# Patient Record
Sex: FEMALE | Race: BLACK OR AFRICAN AMERICAN | NOT HISPANIC OR LATINO | URBAN - METROPOLITAN AREA
[De-identification: names, ages, dates, MRNs, and addresses within clinical notes are randomized per-mention and may not be internally consistent; named-entity substitution may affect disease eponyms.]

---

## 2020-02-09 ENCOUNTER — OUTPATIENT (OUTPATIENT)
Dept: INPATIENT UNIT | Facility: HOSPITAL | Age: 30
LOS: 1 days | End: 2020-02-09
Payer: COMMERCIAL

## 2020-02-09 VITALS
TEMPERATURE: 98 F | SYSTOLIC BLOOD PRESSURE: 105 MMHG | RESPIRATION RATE: 16 BRPM | DIASTOLIC BLOOD PRESSURE: 64 MMHG | HEART RATE: 81 BPM

## 2020-02-09 VITALS — DIASTOLIC BLOOD PRESSURE: 77 MMHG | SYSTOLIC BLOOD PRESSURE: 128 MMHG | HEART RATE: 79 BPM

## 2020-02-09 DIAGNOSIS — O47.02 FALSE LABOR BEFORE 37 COMPLETED WEEKS OF GESTATION, SECOND TRIMESTER: ICD-10-CM

## 2020-02-09 DIAGNOSIS — Z98.890 OTHER SPECIFIED POSTPROCEDURAL STATES: Chronic | ICD-10-CM

## 2020-02-09 LAB
APPEARANCE UR: CLEAR — SIGNIFICANT CHANGE UP
BILIRUB UR-MCNC: NEGATIVE — SIGNIFICANT CHANGE UP
COLOR SPEC: YELLOW — SIGNIFICANT CHANGE UP
DIFF PNL FLD: NEGATIVE — SIGNIFICANT CHANGE UP
GLUCOSE UR QL: NEGATIVE MG/DL — SIGNIFICANT CHANGE UP
KETONES UR-MCNC: ABNORMAL
LEUKOCYTE ESTERASE UR-ACNC: NEGATIVE — SIGNIFICANT CHANGE UP
NITRITE UR-MCNC: NEGATIVE — SIGNIFICANT CHANGE UP
PH UR: 7 — SIGNIFICANT CHANGE UP (ref 5–8)
PROT UR-MCNC: NEGATIVE MG/DL — SIGNIFICANT CHANGE UP
SP GR SPEC: 1.01 — SIGNIFICANT CHANGE UP (ref 1.01–1.02)
UROBILINOGEN FLD QL: NEGATIVE MG/DL — SIGNIFICANT CHANGE UP

## 2020-02-09 PROCEDURE — 87086 URINE CULTURE/COLONY COUNT: CPT

## 2020-02-09 PROCEDURE — 81003 URINALYSIS AUTO W/O SCOPE: CPT

## 2020-02-10 LAB
CULTURE RESULTS: SIGNIFICANT CHANGE UP
SPECIMEN SOURCE: SIGNIFICANT CHANGE UP

## 2020-04-26 ENCOUNTER — MESSAGE (OUTPATIENT)
Age: 30
End: 2020-04-26

## 2020-08-28 PROBLEM — Z00.00 ENCOUNTER FOR PREVENTIVE HEALTH EXAMINATION: Status: ACTIVE | Noted: 2020-08-28

## 2022-02-25 ENCOUNTER — EMERGENCY (EMERGENCY)
Facility: HOSPITAL | Age: 32
LOS: 1 days | Discharge: HOME | End: 2022-02-25
Attending: EMERGENCY MEDICINE
Payer: COMMERCIAL

## 2022-02-25 ENCOUNTER — TRANSCRIPTION ENCOUNTER (OUTPATIENT)
Age: 32
End: 2022-02-25

## 2022-02-25 VITALS
HEART RATE: 53 BPM | WEIGHT: 190.04 LBS | DIASTOLIC BLOOD PRESSURE: 85 MMHG | TEMPERATURE: 98 F | SYSTOLIC BLOOD PRESSURE: 135 MMHG | OXYGEN SATURATION: 99 % | RESPIRATION RATE: 18 BRPM | HEIGHT: 64 IN

## 2022-02-25 DIAGNOSIS — Z98.890 OTHER SPECIFIED POSTPROCEDURAL STATES: Chronic | ICD-10-CM

## 2022-02-25 LAB
ALBUMIN SERPL ELPH-MCNC: 4.2 G/DL — SIGNIFICANT CHANGE UP (ref 3.3–5.2)
ALP SERPL-CCNC: 30 U/L — LOW (ref 40–120)
ALT FLD-CCNC: 14 U/L — SIGNIFICANT CHANGE UP
ANION GAP SERPL CALC-SCNC: 11 MMOL/L — SIGNIFICANT CHANGE UP (ref 5–17)
AST SERPL-CCNC: 21 U/L — SIGNIFICANT CHANGE UP
BASOPHILS # BLD AUTO: 0.05 K/UL — SIGNIFICANT CHANGE UP (ref 0–0.2)
BASOPHILS NFR BLD AUTO: 0.7 % — SIGNIFICANT CHANGE UP (ref 0–2)
BILIRUB SERPL-MCNC: <0.2 MG/DL — LOW (ref 0.4–2)
BUN SERPL-MCNC: 13.8 MG/DL — SIGNIFICANT CHANGE UP (ref 8–20)
CALCIUM SERPL-MCNC: 9.8 MG/DL — SIGNIFICANT CHANGE UP (ref 8.6–10.2)
CHLORIDE SERPL-SCNC: 100 MMOL/L — SIGNIFICANT CHANGE UP (ref 98–107)
CO2 SERPL-SCNC: 26 MMOL/L — SIGNIFICANT CHANGE UP (ref 22–29)
CREAT SERPL-MCNC: 0.76 MG/DL — SIGNIFICANT CHANGE UP (ref 0.5–1.3)
EOSINOPHIL # BLD AUTO: 0.17 K/UL — SIGNIFICANT CHANGE UP (ref 0–0.5)
EOSINOPHIL NFR BLD AUTO: 2.3 % — SIGNIFICANT CHANGE UP (ref 0–6)
GLUCOSE SERPL-MCNC: 95 MG/DL — SIGNIFICANT CHANGE UP (ref 70–99)
HCG SERPL-ACNC: <4 MIU/ML — SIGNIFICANT CHANGE UP
HCT VFR BLD CALC: 35.1 % — SIGNIFICANT CHANGE UP (ref 34.5–45)
HGB BLD-MCNC: 11.6 G/DL — SIGNIFICANT CHANGE UP (ref 11.5–15.5)
IMM GRANULOCYTES NFR BLD AUTO: 0.3 % — SIGNIFICANT CHANGE UP (ref 0–1.5)
LYMPHOCYTES # BLD AUTO: 3.81 K/UL — HIGH (ref 1–3.3)
LYMPHOCYTES # BLD AUTO: 51.3 % — HIGH (ref 13–44)
MCHC RBC-ENTMCNC: 29.1 PG — SIGNIFICANT CHANGE UP (ref 27–34)
MCHC RBC-ENTMCNC: 33 GM/DL — SIGNIFICANT CHANGE UP (ref 32–36)
MCV RBC AUTO: 88.2 FL — SIGNIFICANT CHANGE UP (ref 80–100)
MONOCYTES # BLD AUTO: 0.48 K/UL — SIGNIFICANT CHANGE UP (ref 0–0.9)
MONOCYTES NFR BLD AUTO: 6.5 % — SIGNIFICANT CHANGE UP (ref 2–14)
NEUTROPHILS # BLD AUTO: 2.89 K/UL — SIGNIFICANT CHANGE UP (ref 1.8–7.4)
NEUTROPHILS NFR BLD AUTO: 38.9 % — LOW (ref 43–77)
PLATELET # BLD AUTO: 257 K/UL — SIGNIFICANT CHANGE UP (ref 150–400)
POTASSIUM SERPL-MCNC: 4.6 MMOL/L — SIGNIFICANT CHANGE UP (ref 3.5–5.3)
POTASSIUM SERPL-SCNC: 4.6 MMOL/L — SIGNIFICANT CHANGE UP (ref 3.5–5.3)
PROT SERPL-MCNC: 7.4 G/DL — SIGNIFICANT CHANGE UP (ref 6.6–8.7)
RBC # BLD: 3.98 M/UL — SIGNIFICANT CHANGE UP (ref 3.8–5.2)
RBC # FLD: 13.9 % — SIGNIFICANT CHANGE UP (ref 10.3–14.5)
SODIUM SERPL-SCNC: 137 MMOL/L — SIGNIFICANT CHANGE UP (ref 135–145)
T4 AB SER-ACNC: 7.4 UG/DL — SIGNIFICANT CHANGE UP (ref 4.5–12)
TROPONIN T SERPL-MCNC: <0.01 NG/ML — SIGNIFICANT CHANGE UP (ref 0–0.06)
TSH SERPL-MCNC: 1.49 UIU/ML — SIGNIFICANT CHANGE UP (ref 0.27–4.2)
WBC # BLD: 7.42 K/UL — SIGNIFICANT CHANGE UP (ref 3.8–10.5)
WBC # FLD AUTO: 7.42 K/UL — SIGNIFICANT CHANGE UP (ref 3.8–10.5)

## 2022-02-25 PROCEDURE — 99285 EMERGENCY DEPT VISIT HI MDM: CPT | Mod: 25

## 2022-02-25 PROCEDURE — 99285 EMERGENCY DEPT VISIT HI MDM: CPT

## 2022-02-25 PROCEDURE — 71045 X-RAY EXAM CHEST 1 VIEW: CPT

## 2022-02-25 PROCEDURE — 85025 COMPLETE CBC W/AUTO DIFF WBC: CPT

## 2022-02-25 PROCEDURE — 93010 ELECTROCARDIOGRAM REPORT: CPT

## 2022-02-25 PROCEDURE — 84702 CHORIONIC GONADOTROPIN TEST: CPT

## 2022-02-25 PROCEDURE — 84484 ASSAY OF TROPONIN QUANT: CPT

## 2022-02-25 PROCEDURE — 83735 ASSAY OF MAGNESIUM: CPT

## 2022-02-25 PROCEDURE — 93005 ELECTROCARDIOGRAM TRACING: CPT

## 2022-02-25 PROCEDURE — 84443 ASSAY THYROID STIM HORMONE: CPT

## 2022-02-25 PROCEDURE — 71045 X-RAY EXAM CHEST 1 VIEW: CPT | Mod: 26

## 2022-02-25 PROCEDURE — 36415 COLL VENOUS BLD VENIPUNCTURE: CPT

## 2022-02-25 PROCEDURE — 80053 COMPREHEN METABOLIC PANEL: CPT

## 2022-02-25 PROCEDURE — 84436 ASSAY OF TOTAL THYROXINE: CPT

## 2022-02-25 RX ORDER — IBUPROFEN 200 MG
400 TABLET ORAL ONCE
Refills: 0 | Status: COMPLETED | OUTPATIENT
Start: 2022-02-25 | End: 2022-02-25

## 2022-02-25 RX ADMIN — Medication 400 MILLIGRAM(S): at 18:22

## 2022-02-25 NOTE — ED PROVIDER NOTE - PROGRESS NOTE DETAILS
Dwayne: patient endorsing improved symptoms. return precautions discussed, f/u with her cardiologist.

## 2022-02-25 NOTE — ED PROVIDER NOTE - ATTENDING CONTRIBUTION TO CARE
32yoF; with PMH signif for Pre-Eclampsia, HTN; now p/w chest pain--x5 days, sscp, pressure, non-radiating, non-exertional, non-pleuritic, feels like it began after onset of palpitations, associated with mild dizziness. denies nausea/vomiting. denies diaphoresis. denies numbness/tingling./ denies abd pain. denies back pain. denies f/c/s. denies cough. denies OCP use. denies smoking. denies trauma.  General:     NAD  Head:     NC/AT, EOMI, oral mucosa moist  Neck:     trachea midline  Lungs:     CTA b/l, no w/r/r  CVS:     S1S2, RRR, no m/g/r  Abd:     +BS, s/nt/nd, no organomegaly  Ext:    2+ radial and pedal pulses, no c/c/e  Neuro: AAOx3, no sensory/motor deficits  A/P:  32yoF p/w chest pain  -labs, ekg, cardiac monitor, re-eval.

## 2022-02-25 NOTE — ED PROVIDER NOTE - OBJECTIVE STATEMENT
31 y/o F pmhx of pre-eclampsia, HTN presents w/ chest pain for the past 5 days. states pain began around noon when she was driving in her car today. endorses midline chest pressure and palpitations. states that she saw her cardiologist dr. apodaca earlier this month w/ stress test and echo - showed mitral regurg and PVCs. Endorses marijuana use earlier today but states she uses marijuana frequently and is not anything new for her. denies cocaine use. denies shortness of breath. Denies abdominal pain. Denies nausea/vomiting. Denies fever/chills. Denies trauma. Denies dysuria. Denies changes in bowel movements.

## 2022-02-25 NOTE — ED PROVIDER NOTE - PHYSICAL EXAMINATION
General: Well appearing female in no acute distress  HEENT: Normocephalic, atraumatic. Moist mucous membranes. Oropharynx clear. No lymphadenopathy.  Eyes: No scleral icterus. EOMI. LORRI.  Neck:. Soft and supple. Full ROM without pain. No midline tenderness  Cardiac: Regular rate and regular rhythm. No murmurs, rubs, gallops. Peripheral pulses 2+ and symmetric. No LE edema.  Resp: Lungs CTAB. Speaking in full sentences. No wheezes, rales or rhonchi.  Abd: Soft, non-tender, non-distended. No guarding or rebound. No scars, masses, or lesions.  Back: Spine midline and non-tender. No CVA tenderness.    Skin: No rashes, abrasions, or lacerations.  Neuro: AO x 3. Moves all extremities symmetrically. Motor strength and sensation grossly intact.

## 2022-02-25 NOTE — ED PROVIDER NOTE - NS ED ROS FT
General: Denies fever, chills  HEENT: Denies sensory changes, sore throat  Neck: Denies neck pain, neck stiffness  Resp: Denies coughing, SOB  Cardiovascular: +CP, Denies palpitations, LE edema  GI: Denies nausea, vomiting, abdominal pain, diarrhea, constipation, blood in stool  : Denies dysuria, hematuria, frequency, incontinence  MSK: Denies back pain  Neuro: Denies HA, dizziness, numbness, weakness  Skin: Denies rashes.

## 2022-02-25 NOTE — ED PROVIDER NOTE - PATIENT PORTAL LINK FT
You can access the FollowMyHealth Patient Portal offered by [facility] by registering at the following website: [email or web address]. By joining [employer]’s FollowMyHealth portal, you will also be able to view your health information using other applications (apps) compatible with our system.

## 2022-02-25 NOTE — ED PROVIDER NOTE - CLINICAL SUMMARY MEDICAL DECISION MAKING FREE TEXT BOX
31 y/o F pmhx of pre-eclampsia, HTN presents w/ chest pain for the past 5 days. states pain began around noon when she was driving in her car today. stress test/echo earlier this month w/ cardilogist dr. armas - mitral regurg and PVCs per patient. marijuana use earlier in the year.   r/o ACS, arrhythmia given palpitations/chest discomfort.  ekg, trop , lytes   ibuprofen and reassess

## 2022-02-25 NOTE — ED PROVIDER NOTE - NSFOLLOWUPINSTRUCTIONS_ED_ALL_ED_FT
Followup with your cardiologist in the next 1-10 days for followup and further evaluation and management.     Chest Pain    Chest pain can be caused by many different conditions which may or may not be dangerous. Causes include heartburn, lung infections, heart attack, blood clot in lungs, skin infections, strain or damage to muscle, cartilage, or bones, etc. In addition to a history and physical examination, an electrocardiogram (ECG) or other lab tests may have been performed to determine the cause of your chest pain. Follow up with your primary care provider or with a cardiologist as instructed.     SEEK IMMEDIATE MEDICAL CARE IF YOU HAVE ANY OF THE FOLLOWING SYMPTOMS: worsening chest pain, coughing up blood, unexplained back/neck/jaw pain, severe abdominal pain, dizziness or lightheadedness, fainting, shortness of breath, sweaty or clammy skin, vomiting, or racing heart beat. These symptoms may represent a serious problem that is an emergency. Do not wait to see if the symptoms will go away. Get medical help right away. Call 911 and do not drive yourself to the hospital.

## 2022-02-25 NOTE — ED ADULT NURSE NOTE - OBJECTIVE STATEMENT
Pt aox4. Pt complaining of left sided tooth pain along with chest pain that started 2 days ago. Pt states she the chest pain radiates to left arm. Pt denies SOB, N/ V. Denies recent dental surgeries. PMHx of pre eclampsia. pt educated on plan of care, pt able to successfully teach back plan of care to RN, RN will continue to reeducate pt during hospital stay.

## 2022-02-26 PROBLEM — Z78.9 OTHER SPECIFIED HEALTH STATUS: Chronic | Status: ACTIVE | Noted: 2020-02-09

## 2022-06-01 ENCOUNTER — EMERGENCY (EMERGENCY)
Facility: HOSPITAL | Age: 32
LOS: 1 days | Discharge: HOME | End: 2022-06-01
Attending: EMERGENCY MEDICINE
Payer: COMMERCIAL

## 2022-06-01 VITALS
RESPIRATION RATE: 16 BRPM | HEIGHT: 64 IN | TEMPERATURE: 98 F | HEART RATE: 79 BPM | WEIGHT: 190.04 LBS | DIASTOLIC BLOOD PRESSURE: 96 MMHG | SYSTOLIC BLOOD PRESSURE: 140 MMHG | OXYGEN SATURATION: 99 %

## 2022-06-01 DIAGNOSIS — Z98.890 OTHER SPECIFIED POSTPROCEDURAL STATES: Chronic | ICD-10-CM

## 2022-06-01 LAB
APPEARANCE UR: ABNORMAL
BACTERIA # UR AUTO: ABNORMAL
BILIRUB UR-MCNC: NEGATIVE — SIGNIFICANT CHANGE UP
COLOR SPEC: YELLOW — SIGNIFICANT CHANGE UP
DIFF PNL FLD: ABNORMAL
EPI CELLS # UR: SIGNIFICANT CHANGE UP
GLUCOSE UR QL: NEGATIVE MG/DL — SIGNIFICANT CHANGE UP
HCG UR QL: NEGATIVE — SIGNIFICANT CHANGE UP
KETONES UR-MCNC: ABNORMAL
LEUKOCYTE ESTERASE UR-ACNC: ABNORMAL
NITRITE UR-MCNC: NEGATIVE — SIGNIFICANT CHANGE UP
PH UR: 6 — SIGNIFICANT CHANGE UP (ref 5–8)
PROT UR-MCNC: 100 MG/DL
RBC CASTS # UR COMP ASSIST: >50 /HPF (ref 0–4)
SP GR SPEC: 1.01 — SIGNIFICANT CHANGE UP (ref 1.01–1.02)
UROBILINOGEN FLD QL: NEGATIVE MG/DL — SIGNIFICANT CHANGE UP
WBC UR QL: >50 /HPF (ref 0–5)

## 2022-06-01 PROCEDURE — 87086 URINE CULTURE/COLONY COUNT: CPT

## 2022-06-01 PROCEDURE — 87077 CULTURE AEROBIC IDENTIFY: CPT

## 2022-06-01 PROCEDURE — 99284 EMERGENCY DEPT VISIT MOD MDM: CPT

## 2022-06-01 PROCEDURE — 99283 EMERGENCY DEPT VISIT LOW MDM: CPT

## 2022-06-01 PROCEDURE — 81001 URINALYSIS AUTO W/SCOPE: CPT

## 2022-06-01 PROCEDURE — 81025 URINE PREGNANCY TEST: CPT

## 2022-06-01 RX ORDER — CEPHALEXIN 500 MG
500 CAPSULE ORAL ONCE
Refills: 0 | Status: DISCONTINUED | OUTPATIENT
Start: 2022-06-01 | End: 2022-06-05

## 2022-06-01 RX ORDER — PHENAZOPYRIDINE HCL 100 MG
1 TABLET ORAL
Qty: 6 | Refills: 0
Start: 2022-06-01 | End: 2022-06-02

## 2022-06-01 RX ORDER — CEPHALEXIN 500 MG
1 CAPSULE ORAL
Qty: 28 | Refills: 0
Start: 2022-06-01 | End: 2022-06-07

## 2022-06-01 RX ORDER — PHENAZOPYRIDINE HCL 100 MG
200 TABLET ORAL ONCE
Refills: 0 | Status: COMPLETED | OUTPATIENT
Start: 2022-06-01 | End: 2022-06-01

## 2022-06-01 RX ADMIN — Medication 200 MILLIGRAM(S): at 13:23

## 2022-06-01 NOTE — ED PROVIDER NOTE - NSFOLLOWUPINSTRUCTIONS_ED_ALL_ED_FT
Urinary Tract Infection    A urinary tract infection (UTI) is an infection of any part of the urinary tract, which includes the kidneys, ureters, bladder, and urethra. Risk factors include ignoring your need to urinate, wiping back to front if female, being an uncircumcised male, and having diabetes or a weak immune system. Symptoms include frequent urination, pain or burning with urination, foul smelling urine, cloudy urine, pain in the lower abdomen, blood in the urine, and fever. If you were prescribed an antibiotic medicine, take it as told by your health care provider. Do not stop taking the antibiotic even if you start to feel better.    SEEK IMMEDIATE MEDICAL CARE IF YOU HAVE ANY OF THE FOLLOWING SYMPTOMS: severe back or abdominal pain, fever, inability to keep fluids or medicine down, dizziness/lightheadedness, or a change in mental status.    Please take antibiotics as prescribed    Follow up with PCP    Return if symptoms worsen or persist

## 2022-06-01 NOTE — ED PROVIDER NOTE - PATIENT PORTAL LINK FT
You can access the FollowMyHealth Patient Portal offered by [facility] by registering at the following website: [email or web address]. By joining [employer]’s FollowMyHealth portal, you will also be able to view your health information using other applications (apps) compatible with our system.

## 2022-06-01 NOTE — ED PROVIDER NOTE - NS ED ATTENDING STATEMENT MOD
This was a shared visit with the DAPHNE. I reviewed and verified the documentation and independently performed the documented:

## 2022-06-01 NOTE — ED PROVIDER NOTE - CARE PROVIDER_API CALL
Declan Zavala)  Urology  [address], [place] [address]  Phone: [phone]  Fax: [phone]  Follow Up Time:

## 2022-06-01 NOTE — ED PROVIDER NOTE - ATTENDING APP SHARED VISIT CONTRIBUTION OF CARE
I, Billy Basurto, have personally performed a face to face diagnostic evaluation on this patient. I have reviewed the DAPHNE note and agree with the history, exam and plan of care, except as noted.    33 yo F hx of UTI p/w dysuria x 3 days. urinary incontinence and hematuria. UA showed UTI. keflex given. home with abx.

## 2022-06-01 NOTE — ED PROVIDER NOTE - OBJECTIVE STATEMENT
This is a 32 year old female here c/o urinary frequency, dysuria x 1 day.  She is concerned for UTI.  She denies any h/o STD.  She reports is sexually active with 1 partner, unsure if she is pregnant.  She denies any fevers, chills, n/v/d or any recent travel or rashes.

## 2022-06-03 LAB
CULTURE RESULTS: SIGNIFICANT CHANGE UP
SPECIMEN SOURCE: SIGNIFICANT CHANGE UP

## 2022-06-28 ENCOUNTER — EMERGENCY (EMERGENCY)
Facility: HOSPITAL | Age: 32
LOS: 1 days | Discharge: HOME | End: 2022-06-28
Attending: EMERGENCY MEDICINE
Payer: COMMERCIAL

## 2022-06-28 VITALS
DIASTOLIC BLOOD PRESSURE: 81 MMHG | OXYGEN SATURATION: 99 % | HEART RATE: 69 BPM | RESPIRATION RATE: 16 BRPM | SYSTOLIC BLOOD PRESSURE: 123 MMHG | TEMPERATURE: 98 F

## 2022-06-28 VITALS
SYSTOLIC BLOOD PRESSURE: 138 MMHG | WEIGHT: 199.96 LBS | HEART RATE: 91 BPM | OXYGEN SATURATION: 98 % | HEIGHT: 64 IN | RESPIRATION RATE: 18 BRPM | TEMPERATURE: 98 F | DIASTOLIC BLOOD PRESSURE: 97 MMHG

## 2022-06-28 DIAGNOSIS — Z98.890 OTHER SPECIFIED POSTPROCEDURAL STATES: Chronic | ICD-10-CM

## 2022-06-28 LAB
ALBUMIN SERPL ELPH-MCNC: 4.1 G/DL — SIGNIFICANT CHANGE UP (ref 3.3–5.2)
ALP SERPL-CCNC: 32 U/L — LOW (ref 40–120)
ALT FLD-CCNC: 16 U/L — SIGNIFICANT CHANGE UP
ANION GAP SERPL CALC-SCNC: 11 MMOL/L — SIGNIFICANT CHANGE UP (ref 5–17)
AST SERPL-CCNC: 21 U/L — SIGNIFICANT CHANGE UP
BASOPHILS # BLD AUTO: 0.06 K/UL — SIGNIFICANT CHANGE UP (ref 0–0.2)
BASOPHILS NFR BLD AUTO: 0.9 % — SIGNIFICANT CHANGE UP (ref 0–2)
BILIRUB SERPL-MCNC: 0.2 MG/DL — LOW (ref 0.4–2)
BUN SERPL-MCNC: 10.3 MG/DL — SIGNIFICANT CHANGE UP (ref 8–20)
CALCIUM SERPL-MCNC: 9 MG/DL — SIGNIFICANT CHANGE UP (ref 8.6–10.2)
CHLORIDE SERPL-SCNC: 101 MMOL/L — SIGNIFICANT CHANGE UP (ref 98–107)
CO2 SERPL-SCNC: 24 MMOL/L — SIGNIFICANT CHANGE UP (ref 22–29)
CREAT SERPL-MCNC: 0.67 MG/DL — SIGNIFICANT CHANGE UP (ref 0.5–1.3)
D DIMER BLD IA.RAPID-MCNC: 157 NG/ML DDU — SIGNIFICANT CHANGE UP
EGFR: 118 ML/MIN/1.73M2 — SIGNIFICANT CHANGE UP
EOSINOPHIL # BLD AUTO: 0.1 K/UL — SIGNIFICANT CHANGE UP (ref 0–0.5)
EOSINOPHIL NFR BLD AUTO: 1.5 % — SIGNIFICANT CHANGE UP (ref 0–6)
GLUCOSE SERPL-MCNC: 91 MG/DL — SIGNIFICANT CHANGE UP (ref 70–99)
HCG SERPL-ACNC: <4 MIU/ML — SIGNIFICANT CHANGE UP
HCT VFR BLD CALC: 34.7 % — SIGNIFICANT CHANGE UP (ref 34.5–45)
HGB BLD-MCNC: 11.8 G/DL — SIGNIFICANT CHANGE UP (ref 11.5–15.5)
IMM GRANULOCYTES NFR BLD AUTO: 0.2 % — SIGNIFICANT CHANGE UP (ref 0–1.5)
LYMPHOCYTES # BLD AUTO: 3.9 K/UL — HIGH (ref 1–3.3)
LYMPHOCYTES # BLD AUTO: 60.1 % — HIGH (ref 13–44)
MCHC RBC-ENTMCNC: 30.2 PG — SIGNIFICANT CHANGE UP (ref 27–34)
MCHC RBC-ENTMCNC: 34 GM/DL — SIGNIFICANT CHANGE UP (ref 32–36)
MCV RBC AUTO: 88.7 FL — SIGNIFICANT CHANGE UP (ref 80–100)
MONOCYTES # BLD AUTO: 0.4 K/UL — SIGNIFICANT CHANGE UP (ref 0–0.9)
MONOCYTES NFR BLD AUTO: 6.2 % — SIGNIFICANT CHANGE UP (ref 2–14)
NEUTROPHILS # BLD AUTO: 2.02 K/UL — SIGNIFICANT CHANGE UP (ref 1.8–7.4)
NEUTROPHILS NFR BLD AUTO: 31.1 % — LOW (ref 43–77)
NT-PROBNP SERPL-SCNC: 21 PG/ML — SIGNIFICANT CHANGE UP (ref 0–300)
PLATELET # BLD AUTO: 256 K/UL — SIGNIFICANT CHANGE UP (ref 150–400)
POTASSIUM SERPL-MCNC: 4 MMOL/L — SIGNIFICANT CHANGE UP (ref 3.5–5.3)
POTASSIUM SERPL-SCNC: 4 MMOL/L — SIGNIFICANT CHANGE UP (ref 3.5–5.3)
PROT SERPL-MCNC: 7.5 G/DL — SIGNIFICANT CHANGE UP (ref 6.6–8.7)
RBC # BLD: 3.91 M/UL — SIGNIFICANT CHANGE UP (ref 3.8–5.2)
RBC # FLD: 13.2 % — SIGNIFICANT CHANGE UP (ref 10.3–14.5)
SODIUM SERPL-SCNC: 136 MMOL/L — SIGNIFICANT CHANGE UP (ref 135–145)
TROPONIN T SERPL-MCNC: <0.01 NG/ML — SIGNIFICANT CHANGE UP (ref 0–0.06)
TROPONIN T SERPL-MCNC: <0.01 NG/ML — SIGNIFICANT CHANGE UP (ref 0–0.06)
WBC # BLD: 6.49 K/UL — SIGNIFICANT CHANGE UP (ref 3.8–10.5)
WBC # FLD AUTO: 6.49 K/UL — SIGNIFICANT CHANGE UP (ref 3.8–10.5)

## 2022-06-28 PROCEDURE — 85025 COMPLETE CBC W/AUTO DIFF WBC: CPT

## 2022-06-28 PROCEDURE — 70450 CT HEAD/BRAIN W/O DYE: CPT | Mod: MA

## 2022-06-28 PROCEDURE — 70450 CT HEAD/BRAIN W/O DYE: CPT | Mod: 26,MA

## 2022-06-28 PROCEDURE — 71046 X-RAY EXAM CHEST 2 VIEWS: CPT | Mod: 26

## 2022-06-28 PROCEDURE — 99285 EMERGENCY DEPT VISIT HI MDM: CPT

## 2022-06-28 PROCEDURE — 85379 FIBRIN DEGRADATION QUANT: CPT

## 2022-06-28 PROCEDURE — 93010 ELECTROCARDIOGRAM REPORT: CPT

## 2022-06-28 PROCEDURE — 80053 COMPREHEN METABOLIC PANEL: CPT

## 2022-06-28 PROCEDURE — 36415 COLL VENOUS BLD VENIPUNCTURE: CPT

## 2022-06-28 PROCEDURE — 99285 EMERGENCY DEPT VISIT HI MDM: CPT | Mod: 25

## 2022-06-28 PROCEDURE — 84484 ASSAY OF TROPONIN QUANT: CPT

## 2022-06-28 PROCEDURE — 83880 ASSAY OF NATRIURETIC PEPTIDE: CPT

## 2022-06-28 PROCEDURE — 84702 CHORIONIC GONADOTROPIN TEST: CPT

## 2022-06-28 PROCEDURE — 71046 X-RAY EXAM CHEST 2 VIEWS: CPT

## 2022-06-28 PROCEDURE — 93005 ELECTROCARDIOGRAM TRACING: CPT

## 2022-06-28 RX ORDER — LABETALOL HCL 100 MG
200 TABLET ORAL ONCE
Refills: 0 | Status: COMPLETED | OUTPATIENT
Start: 2022-06-28 | End: 2022-06-28

## 2022-06-28 RX ORDER — NIFEDIPINE 30 MG
60 TABLET, EXTENDED RELEASE 24 HR ORAL ONCE
Refills: 0 | Status: COMPLETED | OUTPATIENT
Start: 2022-06-28 | End: 2022-06-28

## 2022-06-28 RX ORDER — NIFEDIPINE 30 MG
1 TABLET, EXTENDED RELEASE 24 HR ORAL
Qty: 30 | Refills: 0
Start: 2022-06-28 | End: 2022-07-27

## 2022-06-28 RX ORDER — ASPIRIN/CALCIUM CARB/MAGNESIUM 324 MG
325 TABLET ORAL ONCE
Refills: 0 | Status: COMPLETED | OUTPATIENT
Start: 2022-06-28 | End: 2022-06-28

## 2022-06-28 RX ORDER — ACETAMINOPHEN 500 MG
650 TABLET ORAL ONCE
Refills: 0 | Status: COMPLETED | OUTPATIENT
Start: 2022-06-28 | End: 2022-06-28

## 2022-06-28 RX ADMIN — Medication 60 MILLIGRAM(S): at 18:27

## 2022-06-28 RX ADMIN — Medication 650 MILLIGRAM(S): at 17:05

## 2022-06-28 RX ADMIN — Medication 325 MILLIGRAM(S): at 18:27

## 2022-06-28 NOTE — ED ADULT NURSE REASSESSMENT NOTE - NS ED NURSE REASSESS COMMENT FT1
Pt A&O x 4 comfortable. Pt states she feels chest discomfort, not necessarily chest pain. Airway patent. Respirations even and unlabored. No JVD or diaphoresis. Nonslip footwear. Bed locked and in lowest position. Call bell within reach. Able to make needs known. Will continue to monitor.

## 2022-06-28 NOTE — ED PROVIDER NOTE - PROGRESS NOTE DETAILS
spoke to MD Nicole from [facility] spoke to MD Nicole from [facility] reports patient had ischemic work up x 4 months ago, essentially unremarkable, advised to increase nifedipine from 30 to 60, trend serial troponins and EKG and will arrange follow up in office closely ULICES Lee NOTE: Received signout from ULICES Fisher pending rpt labs / CT head  CT head no acute findings  Rpt ECG no acute changes, reviewed with Dr. Bui ULICES Lee NOTE: Rpt trop negative, Reviewed all results and plan; Pt stable for d/c, reports improvement, VSS, tolerating PO, ambulatory.  Discussion includes results, plan, proper medication use/side effects, and return precautions. Pt advised to f/u with PMD 1-2 days and specialists discussed.  Printed copies of available lab/radiology results contained within discharge packet. Pt verbalized understanding/agreement of plan.

## 2022-06-28 NOTE — ED PROVIDER NOTE - CARE PROVIDER_API CALL
Nieves Duran)  Cardiovascular Disease; Internal Medicine; Nuclear Cardiology  [address] [place] [address]  Phone: [phone]  Fax: [phone]  Follow Up Time:     David Smith Jr)  Internal Medicine  [address] [place] [address]  Phone: [phone]  Fax: [phone]  Follow Up Time:    Nieves Duran)  Cardiovascular Disease; Internal Medicine; Nuclear Cardiology  [address] [place] [address]  Phone: [phone]  Fax: [phone]  Follow Up Time:     David Smith Jr)  Internal Medicine  [address] [place] [address]  Phone: [phone]  Fax: [phone]  Follow Up Time:     Alexandre Ramirez)  Ophthalmology  [address] [place] [address]  Phone: [phone]  Fax: [phone]  Follow Up Time: 1-3 Days

## 2022-06-28 NOTE — ED PROVIDER NOTE - ATTENDING APP SHARED VISIT CONTRIBUTION OF CARE
Ramya STONER- 31 Y/O F with no known medical problems p/w sudden onset fo chest pain and sob lasting 10 minutes whole sitting at home this afternoon. 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 No nausea, vomiting, diarrhea. pt on ocp, non smoker. Pt had physical this morning and did not eat and drink well whole day. ED  Jas    Pt is alert, well appearing female, s1s2 mild tachy reg, b/l clear breath sounds, abd soft, nt, nd, neuro exam aox3, cn 2-12 intact, no focal deficits, skin warm, dry, good turgor    ekg showed sinus tachy, plan to check labs, r/o acs cxr, r/o pe with d- dimer and reassess Ramya STONER- 31 Y/O F with     Pt is alert, well appearing female, s1s2 normal reg, b/l clear breath sounds, abd soft, nt, nd, neuro exam aox3, cn 2-12 intact, no focal deficits, skin warm, dry, good turgor Ramya STONER- 31 Y/O F with h/o htn on 2 meds, preeclampsia in sept 2022, marijuana smoker  p/w persistent chest pain today with worsening palpitations for 3 days and eye floaters. Pt is compliant with her htn meds and no weakness, numbness, pt has mild headache, denies pregnancy. Pt had cardia work up with her cardiology and which showed MR and normal stress test. Pt has fhx of cad    Pt is alert, well appearing female, s1s2 normal reg, b/l clear breath sounds, abd soft, nt, nd, neuro exam aox3, cn 2-12 intact, no focal deficits, skin warm, dry, good turgor    plan to treat as acs give novel cardiac risk factors and within 1 yr of preeclampsia, will do ct head

## 2022-06-28 NOTE — ED PROVIDER NOTE - PATIENT PORTAL LINK FT
You can access the FollowMyHealth Patient Portal offered by [facility] by registering at the following website: [email or web address]. By joining [employer]’s FollowMyHealth portal, you will also be able to view your health information using other applications (apps) compatible with our system.

## 2022-06-28 NOTE — ED PROVIDER NOTE - CARE PROVIDERS DIRECT ADDRESSES
,[email or web address],DirectAddress_Unknown [email or web address],DirectAddress_Unknown,DirectAddress_Unknown

## 2022-06-28 NOTE — ED PROVIDER NOTE - NSFOLLOWUPINSTRUCTIONS_ED_ALL_ED_FT
Chest Pain    Chest pain can be caused by many different conditions which may or may not be dangerous. Causes include heartburn, lung infections, heart attack, blood clot in lungs, skin infections, strain or damage to muscle, cartilage, or bones, etc. In addition to a history and physical examination, an electrocardiogram (ECG) or other lab tests may have been performed to determine the cause of your chest pain. Follow up with your primary care provider or with a cardiologist as instructed.     SEEK IMMEDIATE MEDICAL CARE IF YOU HAVE ANY OF THE FOLLOWING SYMPTOMS: worsening chest pain, coughing up blood, unexplained back/neck/jaw pain, severe abdominal pain, dizziness or lightheadedness, fainting, shortness of breath, sweaty or clammy skin, vomiting, or racing heart beat. These symptoms may represent a serious problem that is an emergency. Do not wait to see if the symptoms will go away. Get medical help right away. Call 911 and do not drive yourself to the hospital. - Please follow up with your Primary Care Doctor in 1 - 2 days. If you cannot follow-up with your primary care doctor please return to the Emergency Department for any urgent issues.  - Seek immediate medical care for any new, worsening or concerning signs or symptoms.   - Take medications as directed, be sure to read all instructions on packaging  - You were given copies of all your test results, please bring to your primary care doctor for review of any abnormal test results  - If you have difficulty following up, please call: [phone] or go to [email or web address] to obtain a [facility] doctor or specialist who takes your insurance in your area.    Feel better!      Chest pain can be caused by many different conditions which may or may not be dangerous. Causes include heartburn, lung infections, heart attack, blood clot in lungs, skin infections, strain or damage to muscle, cartilage, or bones, etc. In addition to a history and physical examination, an electrocardiogram (ECG) or other lab tests may have been performed to determine the cause of your chest pain. Follow up with your primary care provider or with a cardiologist as instructed.     SEEK IMMEDIATE MEDICAL CARE IF YOU HAVE ANY OF THE FOLLOWING SYMPTOMS: worsening chest pain, coughing up blood, unexplained back/neck/jaw pain, severe abdominal pain, dizziness or lightheadedness, fainting, shortness of breath, sweaty or clammy skin, vomiting, or racing heart beat. These symptoms may represent a serious problem that is an emergency. Do not wait to see if the symptoms will go away. Get medical help right away. Call 911 and do not drive yourself to the hospital.

## 2022-06-28 NOTE — ED ADULT NURSE NOTE - OBJECTIVE STATEMENT
Pt with PMHx of mitral valve regurg, HTN presents with sternal CP x 3 days. Pt states some decreased PO intake and increased pain with standing. Pt follows cardio and had NST ECHO done which discovered the mitral valve issue. Pt denies any sob, abd pain, N/V/D.

## 2022-06-28 NOTE — ED PROVIDER NOTE - OBJECTIVE STATEMENT
This a 32 year old female here c/o chest pain x 3 days.  She notes nausea, headache and seeing floaters in b/l eyes.  She notes h/o HTN.  She smokes marijuana.  [facility] last saw [facility], had NST, echo that revealed MR.  She notes no vomiting/diarhea, recent travel or rashes, abdominal pain, sick contacts, rashes. This a 32 year old female here c/o chest pain x 3 days.  She notes nausea, headache and seeing floaters in b/l eyes.  She notes h/o HTN.  She smokes marijuana.  She notes f/u with cardiology [facility], last saw x 4 months, had NST, echo that revealed MR.  She notes no vomiting/diarhea, recent travel or rashes, abdominal pain, sick contacts, rashes. + FHM cardiac disease maternal GM with h/o bypass.  Patient reports the last time she felt this she was pre-eclamptic.

## 2022-06-28 NOTE — ED PROVIDER NOTE - PROVIDER TOKENS
PROVIDER:[TOKEN:[[id number]]],PROVIDER:[TOKEN:[[id number]]] PROVIDER:[TOKEN:[[id number]]],PROVIDER:[TOKEN:[[id number]]],PROVIDER:[TOKEN:[[id number]],FOLLOWUP:[1-3 Days]]

## 2022-09-30 ENCOUNTER — EMERGENCY (EMERGENCY)
Facility: HOSPITAL | Age: 32
LOS: 1 days | Discharge: HOME | End: 2022-09-30
Attending: EMERGENCY MEDICINE
Payer: COMMERCIAL

## 2022-09-30 VITALS
TEMPERATURE: 98 F | HEART RATE: 105 BPM | WEIGHT: 199.96 LBS | RESPIRATION RATE: 18 BRPM | OXYGEN SATURATION: 98 % | HEIGHT: 64 IN | SYSTOLIC BLOOD PRESSURE: 118 MMHG | DIASTOLIC BLOOD PRESSURE: 74 MMHG

## 2022-09-30 DIAGNOSIS — Z98.890 OTHER SPECIFIED POSTPROCEDURAL STATES: Chronic | ICD-10-CM

## 2022-09-30 LAB
ALBUMIN SERPL ELPH-MCNC: 4.3 G/DL — SIGNIFICANT CHANGE UP (ref 3.3–5.2)
ALP SERPL-CCNC: 33 U/L — LOW (ref 40–120)
ALT FLD-CCNC: 17 U/L — SIGNIFICANT CHANGE UP
ANION GAP SERPL CALC-SCNC: 15 MMOL/L — SIGNIFICANT CHANGE UP (ref 5–17)
AST SERPL-CCNC: 21 U/L — SIGNIFICANT CHANGE UP
BASOPHILS # BLD AUTO: 0.05 K/UL — SIGNIFICANT CHANGE UP (ref 0–0.2)
BASOPHILS NFR BLD AUTO: 0.8 % — SIGNIFICANT CHANGE UP (ref 0–2)
BILIRUB SERPL-MCNC: <0.2 MG/DL — LOW (ref 0.4–2)
BUN SERPL-MCNC: 12.3 MG/DL — SIGNIFICANT CHANGE UP (ref 8–20)
CALCIUM SERPL-MCNC: 9.8 MG/DL — SIGNIFICANT CHANGE UP (ref 8.4–10.5)
CHLORIDE SERPL-SCNC: 102 MMOL/L — SIGNIFICANT CHANGE UP (ref 98–107)
CO2 SERPL-SCNC: 23 MMOL/L — SIGNIFICANT CHANGE UP (ref 22–29)
CREAT SERPL-MCNC: 0.77 MG/DL — SIGNIFICANT CHANGE UP (ref 0.5–1.3)
D DIMER BLD IA.RAPID-MCNC: 187 NG/ML DDU — SIGNIFICANT CHANGE UP
EGFR: 104 ML/MIN/1.73M2 — SIGNIFICANT CHANGE UP
EOSINOPHIL # BLD AUTO: 0.1 K/UL — SIGNIFICANT CHANGE UP (ref 0–0.5)
EOSINOPHIL NFR BLD AUTO: 1.6 % — SIGNIFICANT CHANGE UP (ref 0–6)
GLUCOSE SERPL-MCNC: 99 MG/DL — SIGNIFICANT CHANGE UP (ref 70–99)
HCG SERPL-ACNC: <4 MIU/ML — SIGNIFICANT CHANGE UP
HCT VFR BLD CALC: 36.1 % — SIGNIFICANT CHANGE UP (ref 34.5–45)
HGB BLD-MCNC: 12.1 G/DL — SIGNIFICANT CHANGE UP (ref 11.5–15.5)
IMM GRANULOCYTES NFR BLD AUTO: 0.3 % — SIGNIFICANT CHANGE UP (ref 0–0.9)
LYMPHOCYTES # BLD AUTO: 2.99 K/UL — SIGNIFICANT CHANGE UP (ref 1–3.3)
LYMPHOCYTES # BLD AUTO: 49.1 % — HIGH (ref 13–44)
MCHC RBC-ENTMCNC: 29.7 PG — SIGNIFICANT CHANGE UP (ref 27–34)
MCHC RBC-ENTMCNC: 33.5 GM/DL — SIGNIFICANT CHANGE UP (ref 32–36)
MCV RBC AUTO: 88.7 FL — SIGNIFICANT CHANGE UP (ref 80–100)
MONOCYTES # BLD AUTO: 0.34 K/UL — SIGNIFICANT CHANGE UP (ref 0–0.9)
MONOCYTES NFR BLD AUTO: 5.6 % — SIGNIFICANT CHANGE UP (ref 2–14)
NEUTROPHILS # BLD AUTO: 2.59 K/UL — SIGNIFICANT CHANGE UP (ref 1.8–7.4)
NEUTROPHILS NFR BLD AUTO: 42.6 % — LOW (ref 43–77)
PLATELET # BLD AUTO: 256 K/UL — SIGNIFICANT CHANGE UP (ref 150–400)
POTASSIUM SERPL-MCNC: 4.1 MMOL/L — SIGNIFICANT CHANGE UP (ref 3.5–5.3)
POTASSIUM SERPL-SCNC: 4.1 MMOL/L — SIGNIFICANT CHANGE UP (ref 3.5–5.3)
PROT SERPL-MCNC: 7.4 G/DL — SIGNIFICANT CHANGE UP (ref 6.6–8.7)
RBC # BLD: 4.07 M/UL — SIGNIFICANT CHANGE UP (ref 3.8–5.2)
RBC # FLD: 13.2 % — SIGNIFICANT CHANGE UP (ref 10.3–14.5)
SODIUM SERPL-SCNC: 140 MMOL/L — SIGNIFICANT CHANGE UP (ref 135–145)
WBC # BLD: 6.09 K/UL — SIGNIFICANT CHANGE UP (ref 3.8–10.5)
WBC # FLD AUTO: 6.09 K/UL — SIGNIFICANT CHANGE UP (ref 3.8–10.5)

## 2022-09-30 PROCEDURE — 99284 EMERGENCY DEPT VISIT MOD MDM: CPT

## 2022-09-30 PROCEDURE — 85025 COMPLETE CBC W/AUTO DIFF WBC: CPT

## 2022-09-30 PROCEDURE — 84702 CHORIONIC GONADOTROPIN TEST: CPT

## 2022-09-30 PROCEDURE — 36415 COLL VENOUS BLD VENIPUNCTURE: CPT

## 2022-09-30 PROCEDURE — 80053 COMPREHEN METABOLIC PANEL: CPT

## 2022-09-30 PROCEDURE — 99283 EMERGENCY DEPT VISIT LOW MDM: CPT

## 2022-09-30 PROCEDURE — 71046 X-RAY EXAM CHEST 2 VIEWS: CPT | Mod: 26

## 2022-09-30 PROCEDURE — 71046 X-RAY EXAM CHEST 2 VIEWS: CPT

## 2022-09-30 PROCEDURE — 85379 FIBRIN DEGRADATION QUANT: CPT

## 2022-09-30 NOTE — ED PROVIDER NOTE - CONSTITUTIONAL, MLM
Patient called back said he called around and no one had the patches its a  recall so he would not be able to get them anywhere. He just wants it to stay the same with calling in to [facility]. He can be reached back at [phone] if any questions.   normal... Well appearing, awake, alert, oriented to person, place, time/situation and in no apparent distress.

## 2022-09-30 NOTE — ED ADULT NURSE NOTE - OBJECTIVE STATEMENT
Assumed care at 1820 pt co cough that is not relieved by inhaler ot cough meds, pt also states she feels like her uterus is going to fall off when she coughs. pt denies any n.v, fevers, chills.

## 2022-09-30 NOTE — ED PROVIDER NOTE - PATIENT PORTAL LINK FT
You can access the FollowMyHealth Patient Portal offered by [facility] by registering at the following website: [email or web address]. By joining [employer]’s FollowMyHealth portal, you will also be able to view your health information using other applications (apps) compatible with our system.

## 2022-09-30 NOTE — ED PROVIDER NOTE - NSFOLLOWUPINSTRUCTIONS_ED_ALL_ED_FT
- Follow up with primary care  - Take medication as directed    Cough    Coughing is a reflex that clears your throat and your airways. Coughing helps to heal and protect your lungs. It is normal to cough occasionally, but a cough that happens with other symptoms or lasts a long time may be a sign of a condition that needs treatment. Coughing may be caused by infections, asthma or COPD, smoking, postnasal drip, gastroesophageal reflux, as well as other medical conditions. Take medicines only as instructed by your health care provider. Avoid environments or triggers that causes you to cough at work or at home.    SEEK IMMEDIATE MEDICAL CARE IF YOU HAVE ANY OF THE FOLLOWING SYMPTOMS: coughing up blood, shortness of breath, rapid heart rate, chest pain, unexplained weight loss or night sweats.

## 2022-11-23 NOTE — ED ADULT NURSE NOTE - SUICIDE SCREENING QUESTION 2
NUTRITION NOTE    Referring Physician: Guido Lopez M.D.   Reason for MNT Referral: Follow-up 1 Week s/p Gastric Sleeve    CURRENT DIET:  Bariatric Liquid Diet    Dehydration assessment:  Urine output/color: dark orange  Chest pain:n  Persistent increased heart rate:n  Fatigue:n  N/V: n  Dizzy/weak: n  BM: y qod  Protein and fluid intake assessment: (food diary)    Fluid intake: 24floz+  Protein supplements: Boost Hi Protein shakes 2/day, 20g ea prot  Protein intake yesterday: 40g    Also consuming - Individual bowl of unsweetened apple sauce, broccoli cheese soup    Vitamins  -What vitamins are you taking? None yet    Plans to start:  Multivitamin with 18 mg iron; taking 2 per day - crushed for now  Super B complex with 50 or 100 mg thiamine   Calcium Citrate 630 mg with vitamin D taken 2 per day (1200-1500mg/day)  Vitamin B-12 5000 mcg taking 2 per month sub liquid dropper    Medications  Omeprazole: y  Hycet: prn  How are you tolerating pain at this time? 2-3 (rate on a scale from 1 to 10; >7 notify PA/MD)  Are you having any problems? N (f/u with PCP, cardiologist, endocrinologist)  How is your support system at home? good  Exercise reminder (light exercise at this time, no lifting above 10 lbs)     Questions for nurse/MA/PA: no       BARIATRIC DIET DISCUSSION:  Reinforced post-op nutrition guidelines.  Continue to work on fluid and protein intake.    PLAN/RECOMMONDATIONS:    Increase protein intake.  Increase fluid intake.  Continue light exercise.  Begin appropriate vitamins & minerals.         Confirmed date and time for 2 week po labs and clinic visit         SESSION TIME: 15 minutes      
No

## 2022-12-03 NOTE — ED ADULT NURSE REASSESSMENT NOTE - HEART RATE (BEATS/MIN)
Interventional Cardiology  Progress Note    Reason for Visit: Cardiovascular revisit  Last visit: 10/12/2029; 10/12/2021; 10/14/2022; 12/5/2022  Referring physician: Luis Simental MD  [facility] [place] [facility] Dr. Ivette Jackson    The patient was seen and examined and the chart was reviewed this date. Thank you for your referral. My impressions and recommendations are as follows:    IMPRESSIONS:  1. Hypertension with HCVD Diastolic CHF NYHA II  -on Norvasc, Toprol, Aldactone, Imdur  -on Lasix    2. Venous Insufficiency CEAP 4 stable  LLE chronic edema and leg pain  - Multiple LLE DVT with thrombophlebitis in the past with Factor V Leiden on chronic coumadin  - Continued daily compression stocking use, but still has venous stasis changes and pain of the ankle  - Jan 11, 2016 Vein mapping with severe reflux of the Right Great Saphenous Vein and left Great Saphenous Vein mid to prox calf  -  worse since right hip surgery 12/2017  - did not tolerate lymphatic pump therapy  - Consistent with compression therapy long standing (more than 12 months) with lymphedema tarda and hyperplasia  - 4/16/2018 s/p Left GSV (knee to prox thigh) Clarivein     10/12/2021; 10/14/2022 12/5/2022  - no active wounds  - Noted skin changes particularly  - using compression therapy without issues  - Conservative management unless wounds arise    3. Coronary artery disease with chronic stable angina  - 2009 distal LAD disease not amenable for PCI due to size  - 9/15/2020, 3/3/2014 and 5/2016 Lexiscan stress test negative  - 8/26/2022; 5/17/2022; 9/18/2020 and 10/21/39928 ECHO 60%  - on Plavix, Coumadin , Norvasc, Toprol, Aldactone, Imdur  - No aspirin to minimize bleeding with triple therapy  - No angina    10/14/2022; 12/5/2022  -stable  -In discussion today we will hold off on any further ischemia testing unless clinically indicated given that risk may outweigh benefits    4. Factor V Leiden deficiency on chronic Coumadin    5.  HANNAH did not tolerate CPAP    6. Heart Murmur  - 9/2020 and 10/2014 ECHO mildly sclerotic AVR  - 5/17/2022 mild AS    7. Persistent atrial fibrillation post op hip surgery 11/2017   -[id number] of 5 (age over 75, female, hypertension, coronary disease) on warfarin  -Rate controlled with metoprolol succinate  -No issues    10/14/2022   -in discussion today we will continue with rate control and anticoagulation barring no issues, we will not pursue possible pulmonary vein isolation cryoablation or cardioversion unless profoundly symptomatic  -Opting for conservative management which would be appropriate given advancing age    6. Fall with s/p right hip and wrist fracture repair 11/2017  - Using wheeled walker consistently    9. Dyslipidemia   -not treated given age and Statin myalgia    10. Contrast Allergy    11. Following with [facility]  - Cystoid macular edema Bilateral eyes 2020    12. History of COVID-19 infection requiring hospitalization 8/2022    RECOMMENDATIONS:  1. Regarding her Venous insufficiency; Leg pain, CVD status  - No further ischemic testing given advancing age  - Not a suitable candidate for deep iliac vein intervention given advancing age, renal insufficiency and profound contrast allergy    - If syncope, chest pain with exertion, or worsening symptoms occur advised to notify office or go to nearest ER  2. Medications and labs reviewed  - No changes; not on ASA with Plavix and Coumadin  - Coumadin management per [facility] with goal INR 2 to 2.5  for Factor V Leiden with recurrent LLE DVT as well as Paroxsymal atrial fibrillation    - Continue all other medications; Plavix 75 mg; no ASA to avoid triple therapy and bleeding risk  - Antihypertensives; Norvasc 2.5 mg, Toprol 50 mg, Imdur 30 mg BID, Aldactone 25 mg once daily, furosemide 20 mg  - Dyslipidemia; no history and not treated given age  1. Exercise at least 150 minutes aerobic activity per week as tolerated.    4. Emphasized the need for low "salt, low fat, low cholesterol diet. 5. Return to clinic in 6 months [address] [place] [address] and 12 months [facility] upon completion of aforementioned studies or sooner for concerns. All questions were answered to the patient's satisfaction and to the best of my abilities. SUBJECTIVE:   Pavan Russell is a 79year old female who presents on 12/5/2022 for a revisit. She has a history of 2009 CAD with noted distal LAD disease not amenable for PCI, Factor V Leiden deficiency with multiple DVT's of the LLE, chronic anticoagulation with Coumadin, HTN, HANNAH. Venous Insufficiency CEAP 4 s/p Left GSV (knee to prox thigh) Clarivein 4/16/2018, history of COVID-19 infection admitted 8/2022    See prior notes for details    Interim Summary: 12/5/2022    Patient was seen in clinic 11/23/2022 with Cardiology: David Garay, TEJ due to bilateral lower extremity edema, low blood pressures and lightheadedness. NT proBNP was elevated at that time normal kidney function. She had reportedly been wearing compression socks faithfully but still had edema. She was reluctant to start furosemide, but tolerating. She has a myriad of complaints ""does not feel good\"" but cannot specify. Offered possible hospitalization but reluctant to proceed. She has been diuresing accordingly still has some shortness of breath concern that her leg skin changes are still present but there is no obvious wounds or ulcers. No rest pain. She states she does not sleep well because she feels cold she does use an electric warming blanket. Discussed her INR goals of 2-2.5. Offered consideration for deep iliac vein ileum evaluation however we will hold off for now given multiple comorbid    Continues to use compression therapy without issues, though notes significant discoloration. No new wounds, ulcers. No rest pain. No claudication. No issues of chest pain or chest pressure. No palpitations.   Unaware of atrial " fibrillation but notes that when she lays on her left side she does feel her heartbeat. No decline in overall functional status. No falls. Using wheeled walker. No GIB (hematemesis, hematochezia, melena), hematuria, epistaxis nor excessive bleeding/bruising. Her noncardiac concerns are related to her osteoarthritis of her hips and knees. She does use her walker faithfully with no falls as mentioned. She has been following with pain specialist and undergoing spinal injections with some modicum of improvement. Denies any chest pain or shortness of breath at rest or with ambulation. Denies any dizziness or lightheadedness. Denies any palpitations, skipped beats or fluttering sensation. Denies any PND or orthopnea. Denies being awoken in the middle of the night with chest pain, shortness of breath or chest pain. Appetite is good. Energy level good. Of note, her  80 y/o (10/14/2022) and daughter did not accompany her today  72 th wedding anniversary Sept 2016  She enjoys the casino  Her  sees Dr. Hoang Ibarra of  is her . Body mass index is 28.12 kg/mÂ².     Past Medical History:   Diagnosis Date   â¢ Allergy to ACE inhibitors    â¢ Bradycardia, unspecified 12/01/2020   â¢ CAD (coronary artery disease)     2009 LAD disease, noninterventional    â¢ Compression fracture of body of thoracic vertebra (CMS/[facility]) 03/22/2016   â¢ Contrast media allergy     HIVES with No swelling of mouth/throat/tongue    â¢ Diaphragmatic hernia    â¢ Dizziness 06/01/2020   â¢ Dyspnea on exertion 06/01/2020   â¢ Essential hypertension, benign 10/17/2005   â¢ Factor V Leiden mutation (CMS/[facility]) 01/28/2019   â¢ Headache 10/13/2020   â¢ Hip fracture (CMS/[facility]) 02/07/2020   â¢ Hip joint stiffness, left 05/22/2019   â¢ History of DVT (deep vein thrombosis)    â¢ Hypertension    â¢ Hypothyroid    â¢ Long term (current) use of anticoagulants 01/28/2019    Coumadin for Factor V leiden, DVT and PAF    â¢ Mixed hyperlipidemia 11/23/2004   â¢ Neck pain 10/13/2020   â¢ Orthostatic hypotension 09/02/2022   â¢ HANNAH (obstructive sleep apnea)    â¢ Osteoarthrosis involving lower leg 02/02/2006   â¢ PAF (paroxysmal atrial fibrillation) (CMS/HCC)    â¢ Posterior neck pain 06/04/2021   â¢ Pyelonephritis 09/02/2022   â¢ Reflux esophagitis 01/24/2006   â¢ Right wrist fracture 09/02/2022   â¢ Sleep apnea 10/17/2005   â¢ Statin intolerance    â¢ Unsteady gait 05/22/2019   â¢ Venous insufficiency     s/p Left GSV (knee to prox thigh) Clarivein 4/16/2018    â¢ Weakness of both lower extremities 05/22/2019          Past Surgical History:   Procedure Laterality Date   â¢ Appendectomy     â¢ Breast biopsy     â¢ Cardiac catherization  2009 2009 LAD disease, noninterventional    â¢ Cholecystectomy     â¢ Dilation and curettage     â¢ Dilation and curettage of uterus     â¢ Hb endovenus cath directed chem ablat Left 04/16/2018    s/p Left GSV (knee to prox thigh) Clarivein 4/16/2018 at [address] Dr[address] Lee Files    â¢ Hemorrhoidectomy     â¢ Hemorrhoidectomy     â¢ Intertrochanteric hip fracture surgery  2017   â¢ Tonsillectomy         Allergies:   ALLERGIES:   Allergen Reactions   â¢ Ace Inhibitors Other (See Comments)     hives   â¢ Alendronic Acid MYALGIA     Joint and muscle pain   â¢ Aspirin Other (See Comments)     Internal bleeding     â¢ Cephalosporins HIVES   â¢ Contrast Media HIVES   â¢ Ethacrynic Acid Other (See Comments)   â¢ Iodides HIVES     BLUE & YELLOW DYE     â¢ Lidocaine Other (See Comments)     Lost eyesight   â¢ Niacin MYALGIA     Joint and muscle pain   â¢ Nitrofurantoin HIVES and ANAPHYLAXIS   â¢ Penicillins HIVES   â¢ Phenobarbital Other (See Comments)   â¢ Phenytoin Sodium Extended Other (See Comments)   â¢ Sulfa Antibiotics HIVES   â¢ Acetaminophen-Codeine Other (See Comments)     hallucinating   â¢ Celecoxib Other (See Comments)     Hives or joint pain   â¢ Erythromycin HIVES   â¢ Gabapentin DIARRHEA     abd pain, diarrhea   â¢ Hydralazine Other (See 69 Comments)     Face got red, eye got bothered   â¢ Hydrocodone-Acetaminophen Other (See Comments)     hallucinations   â¢ Iodinated Diagnostic Agents HIVES     No swelling of mouth/throat/tongue   â¢ Lopressor Hct Cough   â¢ Morphine Other (See Comments)     hallucinations   â¢ Omeprazole HIVES   â¢ Ranitidine Other (See Comments)     Upset stomach/churning/burning. Name brand Zantac is ok. â¢ Spironolactone HIVES     Ok for low doses   â¢ Terazosin Hcl SWELLING   â¢ Valsartan Other (See Comments)     Cramping   â¢ Yellow Dye   (Food Or Med) HIVES     # 6 and #10   â¢ Fosamax MYALGIA     Joint and muscle pain   â¢ Statins Other (See Comments)     Intolerant to multiple statins. Muscle and joint pain. Medications:   Current Outpatient Medications   Medication Sig Dispense Refill   â¢ levothyroxine 50 MCG tablet TAKE 1/2 TABLET BY MOUTH EVERY DAY 45 tablet 1   â¢ furosemide (Lasix) 40 MG tablet Take 1 tablet by mouth daily. 30 tablet 1   â¢ amoxicillin-clavulanate (AUGMENTIN) 500-125 MG per tablet Every Twelve Hours     â¢ HYDROcodone-acetaminophen (NORCO) 5-325 MG per tablet Every 6 Hours as needed for Pain     â¢ ondansetron (ZOFRAN ODT) 4 MG disintegrating tablet Every 6 Hours as needed for Nausea     â¢ Saline Spray 0.65 % Solution Twice Daily     â¢ diphenhydrAMINE (BENADRYL) 50 MG tablet Every 6 Hours as needed for Allergies     â¢ ketoconazole (NIZORAL) 2 % cream APPLY TO THE AFFECTED AREA(S) TWICE DAILY FOR 4 WEEKS. 30 g 1   â¢ clopidogrel (PLAVIX) 75 MG tablet Take 1 tablet by mouth daily. 90 tablet 3   â¢ metoPROLOL succinate (TOPROL-XL) 50 MG 24 hr tablet Take one 50 mg tablet in am and one 50 mg tablet in pm. 180 tablet 3   â¢ nystatin-triamcinolone (MYCOLOG II) 974952-0.1 UNIT/GM-% cream Apply topically 2 times daily. To affected area. 60 g 1   â¢ warfarin (COUMADIN) 1 MG tablet (warfarin) Take 3 mg (3 tabs) on Mon/Wed/Sat and take 4 mg (4 tabs) on all other days and as directed.  360 tablet 0   â¢ mometasone (ELOCON) 0.1 % cream Apply a thin layer to the opening of the affected ear twice a day for a week, then as needed for itchiness and dryness 15 g 0   â¢ triamcinolone (ARISTOCORT) 0.1 % cream Apply to area affected twice daily for 7-10 days as directed. 80 g 0   â¢ risedronate (ACTONEL) 35 MG tablet Take one weekly as directed 12 tablet 2   â¢ nitroGLYcerin (NITROSTAT) 0.4 MG sublingual tablet Place 1 tablet under the tongue every 5 minutes as needed for Chest pain. 25 tablet 0   â¢ neomycin-polymyxin-hydroCORTisone (CORTISPORIN) 3.5-84386-7 otic suspension INSTILL 4 DROPS INTO THE LEFT EAR 3 TIMES A DAY FOR 1 WEEK     â¢ isosorbide mononitrate (IMDUR) 30 MG 24 hr tablet TAKE 1 TABLET BY MOUTH 2 TIMES DAILY. 180 tablet 3   â¢ spironolactone (Aldactone) 25 MG tablet Take 0.5 tablets by mouth daily. 90 tablet 3   â¢ cyclobenzaprine (FLEXERIL) 5 MG tablet TAKE 1 TABLET BY MOUTH NIGHTLY AS NEEDED FOR MUSCLE SPASM 30 tablet 0   â¢ chlorhexidine gluconate (PERIDEX) 0.12 % solution 15 mLs 2 times daily. â¢ vitamin B-12 (CYANOCOBALAMIN) 1000 MCG tablet Take 1,000 mcg by mouth daily. â¢ dorzolamide (TRUSOPT) 2 % ophthalmic solution Place 1 drop into left eye 2 times daily. â¢ metroNIDAZOLE (METROCREAM) 0.75 % cream Apply topically 2 times daily. 45 g 3   â¢ Durezol 0.05 % ophthalmic emulsion Place 1 drop into both eyes 2 times daily. â¢ estradiol (ESTRACE) 0.1 MG/GM vaginal cream Place 1 g vaginally 3 days a week. 42.5 g 12   â¢ nepafenac (NEVANAC) 0.1 % ophthalmic suspension Place 1 drop into both eyes 2 times daily. 3 mL 0   â¢ calcium citrate-vitamin D (CITRACAL+D) 315-250 MG-UNIT per tablet Take 1 tablet by mouth daily. â¢ Vitamin D, Cholecalciferol, 1000 units Tab Take  by mouth daily. - Oral     â¢ Cranberry 300 MG Tab Take 500 mg by mouth 2 times daily. â¢ loratadine (CLARITIN) 10 MG tablet 1 tablet daily as needed     â¢ MAGNESIUM PO Take 250 mg by mouth daily.       â¢ Probiotic Product (PROBIOTIC DAILY) Cap 1 tab daily "    No current facility-administered medications for this visit. Social History:   Social History     Socioeconomic History   â¢ Marital status: /Civil Union     Spouse name: Not on file   â¢ Number of children: Not on file   â¢ Years of education: Not on file   â¢ Highest education level: Not on file   Occupational History   â¢ Not on file   Tobacco Use   â¢ Smoking status: Never Smoker   â¢ Smokeless tobacco: Never Used   Vaping Use   â¢ Vaping Use: never used   Substance and Sexual Activity   â¢ Alcohol use: Yes     Alcohol/week: 2.0 - 3.0 standard drinks     Types: 2 - 3 Cans of beer per week   â¢ Drug use: No   â¢ Sexual activity: Not Currently     Partners: Male     Birth control/protection: Post-menopausal   Other Topics Concern   â¢ Not on file   Social History Narrative   â¢ Not on file     Social Determinants of Health     Financial Resource Strain: Not on file   Food Insecurity: Not on file   Transportation Needs: Not on file   Physical Activity: Not on file   Stress: Not on file   Social Connections: Not on file   Intimate Partner Violence: Not on file       Family History:   Family History   Problem Relation Age of Onset   â¢ Other Mother         brain aneurysm   â¢ Stroke/TIA Mother    â¢ Hypertension Mother    â¢ Cancer Father         stomach   â¢ Diabetes Father    â¢ Hypertension Father    â¢ Myocardial Infarction Father        ROS  Review of Systems   Constitutional: Negative. HENT: Negative. Eyes: Negative. Respiratory: Negative. Cardiovascular: Negative. Gastrointestinal: Negative. Endocrine: Negative. Genitourinary: Negative. Musculoskeletal: Positive for arthralgias. Skin: Negative. Allergic/Immunologic: Negative. Neurological: Negative. Hematological: Negative. Psychiatric/Behavioral: Negative.         OBJECTIVE:  Visit Vitals  /74 (BP Location: LUE - Left upper extremity, Patient Position: Sitting)   Pulse 90   Ht 5' 6"" (1.676 m)   Wt 79 kg (174 lb 3.2 oz) " SpO2 100%   BMI 28.12 kg/mÂ²       Physical Exam  Physical Exam  Vitals and nursing note reviewed. Constitutional:       Appearance: She is well-developed. HENT:      Head: Normocephalic and atraumatic. Eyes:      Conjunctiva/sclera: Conjunctivae normal.      Pupils: Pupils are equal, round, and reactive to light. Cardiovascular:      Rate and Rhythm: Normal rate. Rhythm irregularly irregular. Pulses: Normal pulses. Carotid pulses are 2+ on the right side and 2+ on the left side. Radial pulses are 2+ on the right side and 2+ on the left side. Dorsalis pedis pulses are 2+ on the right side and 2+ on the left side. Posterior tibial pulses are 2+ on the right side and 2+ on the left side. Heart sounds: Murmur heard. Systolic murmur is present with a grade of 2/6. Comments: Radial Jone's test  Venous Insufficiency CEAP 3-4  Compression stockings in place  No edema  Using wheeled walker  Pulmonary:      Effort: Pulmonary effort is normal.      Breath sounds: Normal breath sounds. Abdominal:      General: Bowel sounds are normal.      Palpations: Abdomen is soft. Musculoskeletal:         General: Normal range of motion. Cervical back: Normal range of motion and neck supple. Right lower leg: No edema. Left lower leg: No edema. Skin:     General: Skin is warm and dry. Neurological:      Mental Status: She is alert and oriented to person, place, and time. Comments: Using walker         Lab / Testing: The following labs and diagnostic studies were reviewed by me independently and discussed with the patient. Refer to individual report(s) for complete details.     Lab Services on 12/03/2022   Component Date Value Ref Range Status   â¢ Sodium 12/03/2022 137  135 - 145 mmol/L Final   â¢ Potassium 12/03/2022 3.8  3.4 - 5.1 mmol/L Final   â¢ Chloride 12/03/2022 99  97 - 110 mmol/L Final   â¢ Carbon Dioxide 12/03/2022 32  21 - 32 mmol/L Final   â¢ Anion Gap 12/03/2022 10  7 - 19 mmol/L Final   â¢ Glucose 12/03/2022 91  70 - 99 mg/dL Final   â¢ BUN 12/03/2022 19  6 - 20 mg/dL Final   â¢ Creatinine 12/03/2022 0.99 (A) 0.51 - 0.95 mg/dL Final   â¢ Glomerular Filtration Rate 12/03/2022 55 (A) >=60 Final   â¢ BUN/ Creatinine Ratio 12/03/2022 19  7 - 25 Final   â¢ Calcium 12/03/2022 9.0  8.4 - 10.2 mg/dL Final   â¢ NT-proBNP 12/03/2022 1,807 (A) <=450 pg/mL Final   â¢ Prothrombin Time 12/03/2022 23.1 (A) 9.7 - 11.8 sec Final   â¢ INR 12/03/2022 2.4    Final   Anti-Coag on 11/29/2022   Component Date Value Ref Range Status   â¢ INR 11/29/2022 2.5   Final   Anti-Coag on 11/26/2022   Component Date Value Ref Range Status   â¢ INR 11/26/2022 2.0   Final   Anti-Coag on 11/22/2022   Component Date Value Ref Range Status   â¢ INR 11/22/2022 1.7   Final   Anti-Coag on 11/19/2022   Component Date Value Ref Range Status   â¢ INR 11/19/2022 1.7 (A) 1.8 - 2 Final   Lab Services on 11/17/2022   Component Date Value Ref Range Status   â¢ TSH 11/17/2022 2.285  0.350 - 5.000 mcUnits/mL Final   â¢ Sodium 11/17/2022 135  135 - 145 mmol/L Final   â¢ Potassium 11/17/2022 4.0  3.4 - 5.1 mmol/L Final   â¢ Chloride 11/17/2022 99  97 - 110 mmol/L Final   â¢ Carbon Dioxide 11/17/2022 29  21 - 32 mmol/L Final   â¢ Anion Gap 11/17/2022 11  7 - 19 mmol/L Final   â¢ Glucose 11/17/2022 128 (A) 70 - 99 mg/dL Final   â¢ BUN 11/17/2022 20  6 - 20 mg/dL Final   â¢ Creatinine 11/17/2022 0.74  0.51 - 0.95 mg/dL Final   â¢ Glomerular Filtration Rate 11/17/2022 77  >=60 Final   â¢ BUN/ Creatinine Ratio 11/17/2022 27 (A) 7 - 25 Final   â¢ Calcium 11/17/2022 9.0  8.4 - 10.2 mg/dL Final   â¢ Bilirubin, Total 11/17/2022 1.2 (A) 0.2 - 1.0 mg/dL Final   â¢ GOT/AST 11/17/2022 18  <=37 Units/L Final   â¢ GPT/ALT 11/17/2022 17  <64 Units/L Final   â¢ Alkaline Phosphatase 11/17/2022 60  45 - 117 Units/L Final   â¢ Albumin 11/17/2022 3.9  3.6 - 5.1 g/dL Final   â¢ Protein, Total 11/17/2022 7.0  6.4 - 8.2 g/dL Final   â¢ Globulin 11/17/2022 3.1 2.0 - 4.0 g/dL Final   â¢ A/G Ratio 11/17/2022 1.3  1.0 - 2.4 Final   â¢ NT-proBNP 11/17/2022 2,930 (A) <=450 pg/mL Final   â¢ Prothrombin Time 11/17/2022 23.3 (A) 9.7 - 11.8 sec Final   â¢ INR 11/17/2022 2.3    Final   â¢ WBC 11/17/2022 8.5  4.2 - 11.0 K/mcL Final   â¢ RBC 11/17/2022 4.43  4.00 - 5.20 mil/mcL Final   â¢ HGB 11/17/2022 12.7  12.0 - 15.5 g/dL Final   â¢ HCT 11/17/2022 38.9  36.0 - 46.5 % Final   â¢ MCV 11/17/2022 87.8  78.0 - 100.0 fl Final   â¢ MCH 11/17/2022 28.7  26.0 - 34.0 pg Final   â¢ MCHC 11/17/2022 32.6  32.0 - 36.5 g/dL Final   â¢ RDW-CV 11/17/2022 15.2 (A) 11.0 - 15.0 % Final   â¢ RDW-SD 11/17/2022 48.7  39.0 - 50.0 fL Final   â¢ PLT 11/17/2022 178  140 - 450 K/mcL Final   â¢ NRBC 11/17/2022 0  <=0 /100 WBC Final   â¢ Neutrophil, Percent 11/17/2022 77  % Final   â¢ Lymphocytes, Percent 11/17/2022 15  % Final   â¢ Mono, Percent 11/17/2022 7  % Final   â¢ Eosinophils, Percent 11/17/2022 0  % Final   â¢ Basophils, Percent 11/17/2022 0  % Final   â¢ Immature Granulocytes 11/17/2022 1  % Final   â¢ Absolute Neutrophils 11/17/2022 6.5  1.8 - 7.7 K/mcL Final   â¢ Absolute Lymphocytes 11/17/2022 1.3  1.0 - 4.0 K/mcL Final   â¢ Absolute Monocytes 11/17/2022 0.6  0.3 - 0.9 K/mcL Final   â¢ Absolute Eosinophils  11/17/2022 0.0  0.0 - 0.5 K/mcL Final   â¢ Absolute Basophils 11/17/2022 0.0  0.0 - 0.3 K/mcL Final   â¢ Absolute Immmature Granulocytes 11/17/2022 0.1  0.0 - 0.2 K/mcL Final   Anti-Coag on 11/17/2022   Component Date Value Ref Range Status   â¢ INR 11/17/2022 2.3   Final   Anti-Coag on 11/08/2022   Component Date Value Ref Range Status   â¢ INR 11/08/2022 2.1   Final   Anti-Coag on 10/29/2022   Component Date Value Ref Range Status   â¢ INR 10/29/2022 2.2   Final   Anti-Coag on 10/22/2022   Component Date Value Ref Range Status   â¢ INR 10/22/2022 2.3  1.8 - 2.5 Final   There may be more visits with results that are not included.        EKG 10/14/2022 (reviewed Independently interpreted)  Atrial fibrillation 80 BPM, Low voltage, RSR'    EKG 10/12/2021 (reviewed Independently interpreted)  Sinus bradycardia 54, PAC #1    EKG 6/23/2021 (reviewed Independently interpreted)  Normal Sinus Rhythm HR 69, PVC    EKG 10/12/2020 (reviewed Independently interpreted)  Normal Sinus Rhythm HR 60    ECHO 8/26/2022 (independently interpreted and reviewed personally)  Normal left ventricular size and systolic function. EF 65%. No evidence of any left ventricular regional wall motion    abnormalities. Mild concentric left ventricular hypertrophy. Diastolic measurement was not done due to abnormal rhythm. Mild mitral regurgitation. Moderate to severe aortic sclerosis without evidence for stenosis. Mild to moderate tricuspid regurgitation. Pulmonary artery systolic pressure is estimated at 45-50 mmHg. ECHO 5/17/2022 (independently interpreted and reviewed personally)  1. Left ventricle: The cavity size is normal. Wall thickness is normal.     The ejection fraction was measured by biplane method of disks. Unable     to accurately assess left ventricular diastolic function parameters due     to atrial fibrillation. The ejection fraction is 57%. 2. Aortic valve: The annulus is calcified. The valve is trileaflet. The     leaflets are moderately thickened and moderately calcified. Transvalvular velocity is increased, due to stenosis. There is mild     stenosis. The mean systolic gradient is 6mm Hg. The peak systolic     gradient is 73PS Hg. 3. Mitral valve: The annulus is moderately calcified. The leaflets are     moderately thickened and moderately calcified. There is focal     calcification of the leaflet tips. Mild regurgitation. 4. Right ventricle: The cavity size is normal. Wall thickness is normal.     Systolic function is reduced. 5. Tricuspid valve: Moderate regurgitation. 6. Pulmonary arteries: Systolic pressure is increased, estimated to be     43mm Hg. 7. Baseline ECG: Atrial fibrillation.   8. Due to extensive allergies/sensitivities, Definity not given at time of     study. ECHO 9/18/2020 (independently interpreted and reviewed personally)  1. Left ventricle: The cavity size is normal. Wall thickness is mildly     increased. Systolic function is normal. The estimated ejection fraction     is 55-60%. 2. Aortic valve: The valve is trileaflet. The leaflets are mildly     sclerotic. 3. Mitral valve: The annulus is mildly to moderately calcified. The     leaflets are mildly thickened. 4. Pulmonary arteries: Systolic pressure is mildly increased, estimated to     be 39mm Hg. Â  Â    Lexiscan nuclear stress test 9/15/2020 (independently interpreted and reviewed personally)  IMPRESSION:   1. Abnormal myocardial perfusion examination. 2. The overall quality of the study is good. 3. Small fixed perfusion defect involving the mid to distal anterior wall extending into the apical cap. No reversible ischemia is noted. 4. normal left ventricular volume with normal systolic thickening and function and an ejection fraction of 64%. 5. No previous study was available for comparison    [facility] and reviewed personally)  1. Right: The right femoropopliteal vessels demonstrate normal flow velocities and waveforms. 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 2. Left:   At the common femoral and proximal SFA, waveforms are triphasic, however by the distal   portion, the waveforms become biphasic. Although no elevated velocities demonstrated, this could   represent underlying SFA stenosis. Correlate for calf claudication. In addition, there are severely   diminished waveforms at the posterior tibial artery and peroneal arteries, suggesting   infrapopliteal disease. Venous mapping with reflux [facility] and reviewed personally)  1.  Clinically significant reflux at the right proximal and distal great  saphenous vein and at the right proximal small saphenous vein. 2. No clinically significant reflux in the left lower extremity. 3. No DVT of the bilateral lower extremities. Â   Carotid arterial doppler 5/24/2021 (independently interpreted and reviewed personally)  1. Less than 50% stenosis involving the right internal carotid artery. 2. Antegrade flow noted in the right vertebral artery. 3. Less than 50% stenosis involving the left internal carotid artery. 4. Antegrade flow noted in the left vertebral artery. (12/2/2020-1/11/2021) reviewed at request of ordering provider. Predominantly NSR (HR variability 59-86 bpm) with occasional ventricular ectopy but no high-grade dysrhythmias. Symptoms of shortness of breath correlated with sinus rhythm and rare PAC  Palpitations correlated with sinus rhythm  Dizziness and shortness of breath correlated with sinus rhythm with VPCs      Electronically Signed by:    Madison Flores DO , 12/5/2022    [This note used Dragon technology. Transcription errors are not uncommon and may not have been corrected prior to electronically signing the note. Should you find these errors, please consult the clinician for interpretation (or apply common sense adjustment when safe and appropriate). ]  This note is not being shared electronically with the patient because, sharing the note is likely to endanger the safety of the patient or someone else.

## 2022-12-06 ENCOUNTER — NON-APPOINTMENT (OUTPATIENT)
Age: 32
End: 2022-12-06

## 2022-12-08 ENCOUNTER — NON-APPOINTMENT (OUTPATIENT)
Age: 32
End: 2022-12-08

## 2023-10-17 NOTE — ED ADULT NURSE NOTE - BREATH SOUNDS, LEFT
clear Pain Refusal Text: I offered to prescribe pain medication but the patient refused to take this medication.